# Patient Record
Sex: FEMALE | Race: AMERICAN INDIAN OR ALASKA NATIVE
[De-identification: names, ages, dates, MRNs, and addresses within clinical notes are randomized per-mention and may not be internally consistent; named-entity substitution may affect disease eponyms.]

---

## 2019-01-22 ENCOUNTER — HOSPITAL ENCOUNTER (EMERGENCY)
Dept: HOSPITAL 5 - ED | Age: 64
LOS: 1 days | Discharge: LEFT BEFORE DISCHARGE | End: 2019-01-23
Payer: SELF-PAY

## 2019-01-22 VITALS — DIASTOLIC BLOOD PRESSURE: 88 MMHG | SYSTOLIC BLOOD PRESSURE: 151 MMHG

## 2019-01-22 DIAGNOSIS — R11.10: Primary | ICD-10-CM

## 2019-01-22 DIAGNOSIS — Z53.21: ICD-10-CM

## 2019-01-22 PROCEDURE — 36415 COLL VENOUS BLD VENIPUNCTURE: CPT

## 2019-01-22 PROCEDURE — 74176 CT ABD & PELVIS W/O CONTRAST: CPT

## 2019-01-22 PROCEDURE — 80053 COMPREHEN METABOLIC PANEL: CPT

## 2019-01-22 PROCEDURE — 85025 COMPLETE CBC W/AUTO DIFF WBC: CPT

## 2019-01-22 PROCEDURE — 81001 URINALYSIS AUTO W/SCOPE: CPT

## 2019-01-23 LAB
ALBUMIN SERPL-MCNC: 4.4 G/DL (ref 3.9–5)
ALT SERPL-CCNC: 21 UNITS/L (ref 7–56)
BACTERIA #/AREA URNS HPF: (no result) /HPF
BASOPHILS # (AUTO): 0.1 K/MM3 (ref 0–0.1)
BASOPHILS NFR BLD AUTO: 0.8 % (ref 0–1.8)
BILIRUB UR QL STRIP: (no result)
BLOOD UR QL VISUAL: (no result)
BUN SERPL-MCNC: 19 MG/DL (ref 7–17)
BUN/CREAT SERPL: 19 %
CALCIUM SERPL-MCNC: 9.7 MG/DL (ref 8.4–10.2)
EOSINOPHIL # BLD AUTO: 0.1 K/MM3 (ref 0–0.4)
EOSINOPHIL NFR BLD AUTO: 1 % (ref 0–4.3)
HCT VFR BLD CALC: 38.4 % (ref 30.3–42.9)
HEMOLYSIS INDEX: 21
HGB BLD-MCNC: 13.6 GM/DL (ref 10.1–14.3)
HYALINE CASTS #/AREA URNS LPF: 1 /LPF
LYMPHOCYTES # BLD AUTO: 1.6 K/MM3 (ref 1.2–5.4)
LYMPHOCYTES NFR BLD AUTO: 18 % (ref 13.4–35)
MCHC RBC AUTO-ENTMCNC: 35 % (ref 30–34)
MCV RBC AUTO: 81 FL (ref 79–97)
MONOCYTES # (AUTO): 0.7 K/MM3 (ref 0–0.8)
MONOCYTES % (AUTO): 7.4 % (ref 0–7.3)
MUCOUS THREADS #/AREA URNS HPF: (no result) /HPF
PH UR STRIP: 5 [PH] (ref 5–7)
PLATELET # BLD: 247 K/MM3 (ref 140–440)
PROT UR STRIP-MCNC: (no result) MG/DL
RBC # BLD AUTO: 4.72 M/MM3 (ref 3.65–5.03)
RBC #/AREA URNS HPF: 1 /HPF (ref 0–6)
UROBILINOGEN UR-MCNC: 2 MG/DL (ref ?–2)
WBC #/AREA URNS HPF: < 1 /HPF (ref 0–6)

## 2019-01-23 NOTE — CAT SCAN REPORT
FINAL REPORT



EXAM:  CT ABDOMEN PELVIS WO CON



HISTORY:  R sided abd pain w/vomiting 



TECHNIQUE:  Routine axial imaging was obtained of the abdomen and pelvis without oral or IV contrast.
 Sagittal and coronal reconstructions were reviewed.



FINDINGS:  

The lung bases are clear. Pleural fluid is not seen.



The gallbladder has been removed. The liver and biliary tree appear normal. The pancreas, spleen both
 appear normal. There is a low-density 14.5 mm low-density left adrenal nodule. The right adrenal gla
nd appears normal. The kidneys reveal benign cortical cysts bilaterally measuring up to 3.5 cm in alistair
meter. There is no evidence of hydronephrosis. The bowel loops are normal caliber. There are multiple
 uncomplicated colonic diverticula. The appendix appears normal. There is no evidence of free fluid o
r adenopathy. In the pelvis the uterus and bladder appear normal. The skeletal structures reveal arth
ritic changes lower lumbar spine. 



IMPRESSION:  

Cholecystectomy. No acute process in the abdomen and pelvis



Normal appendix.



Uncomplicated colonic diverticulosis.



Benign cortical cysts in both kidneys. No evidence of hydronephrosis.



14.5 mm low-density left adrenal nodule most likely representing an incidental adenoma.



Degenerative arthritic changes lumbar spine.